# Patient Record
Sex: MALE | Race: WHITE | URBAN - METROPOLITAN AREA
[De-identification: names, ages, dates, MRNs, and addresses within clinical notes are randomized per-mention and may not be internally consistent; named-entity substitution may affect disease eponyms.]

---

## 2023-09-08 ENCOUNTER — HOSPITAL ENCOUNTER (EMERGENCY)
Facility: HOSPITAL | Age: 23
Discharge: HOME | End: 2023-09-08
Attending: EMERGENCY MEDICINE

## 2023-09-08 VITALS
DIASTOLIC BLOOD PRESSURE: 75 MMHG | SYSTOLIC BLOOD PRESSURE: 121 MMHG | OXYGEN SATURATION: 100 % | TEMPERATURE: 97.9 F | HEART RATE: 83 BPM | RESPIRATION RATE: 17 BRPM

## 2023-09-08 DIAGNOSIS — F19.10 POLYSUBSTANCE ABUSE (HCC): Primary | ICD-10-CM

## 2023-09-08 DIAGNOSIS — R56.9 CONVULSIONS, UNSPECIFIED CONVULSION TYPE (HCC): ICD-10-CM

## 2023-09-08 LAB
ALBUMIN SERPL-MCNC: 4 G/DL (ref 3.5–5)
ALBUMIN/GLOB SERPL: 1 (ref 1.1–2.2)
ALP SERPL-CCNC: 88 U/L (ref 45–117)
ALT SERPL-CCNC: 40 U/L (ref 12–78)
AMPHET UR QL SCN: NEGATIVE
ANION GAP SERPL CALC-SCNC: 9 MMOL/L (ref 5–15)
APAP SERPL-MCNC: <2 UG/ML (ref 10–30)
AST SERPL-CCNC: 25 U/L (ref 15–37)
BARBITURATES UR QL SCN: NEGATIVE
BASOPHILS # BLD: 0.1 K/UL (ref 0–0.1)
BASOPHILS NFR BLD: 1 % (ref 0–1)
BENZODIAZ UR QL: POSITIVE
BILIRUB SERPL-MCNC: 0.5 MG/DL (ref 0.2–1)
BUN SERPL-MCNC: 9 MG/DL (ref 6–20)
BUN/CREAT SERPL: 7 (ref 12–20)
CALCIUM SERPL-MCNC: 9.1 MG/DL (ref 8.5–10.1)
CANNABINOIDS UR QL SCN: POSITIVE
CHLORIDE SERPL-SCNC: 107 MMOL/L (ref 97–108)
CO2 SERPL-SCNC: 25 MMOL/L (ref 21–32)
COCAINE UR QL SCN: POSITIVE
COMMENT:: NORMAL
CREAT SERPL-MCNC: 1.27 MG/DL (ref 0.7–1.3)
DIFFERENTIAL METHOD BLD: ABNORMAL
EOSINOPHIL # BLD: 0.4 K/UL (ref 0–0.4)
EOSINOPHIL NFR BLD: 3 % (ref 0–7)
ERYTHROCYTE [DISTWIDTH] IN BLOOD BY AUTOMATED COUNT: 13.6 % (ref 11.5–14.5)
ETHANOL SERPL-MCNC: 189 MG/DL (ref 0–0.08)
GLOBULIN SER CALC-MCNC: 4.1 G/DL (ref 2–4)
GLUCOSE SERPL-MCNC: 104 MG/DL (ref 65–100)
HCT VFR BLD AUTO: 49.2 % (ref 36.6–50.3)
HGB BLD-MCNC: 17.2 G/DL (ref 12.1–17)
IMM GRANULOCYTES # BLD AUTO: 0 K/UL
IMM GRANULOCYTES NFR BLD AUTO: 0 %
LYMPHOCYTES # BLD: 5.9 K/UL (ref 0.8–3.5)
LYMPHOCYTES NFR BLD: 49 % (ref 12–49)
Lab: ABNORMAL
MCH RBC QN AUTO: 32.4 PG (ref 26–34)
MCHC RBC AUTO-ENTMCNC: 35 G/DL (ref 30–36.5)
MCV RBC AUTO: 92.7 FL (ref 80–99)
METHADONE UR QL: NEGATIVE
MONOCYTES # BLD: 0.9 K/UL (ref 0–1)
MONOCYTES NFR BLD: 7 % (ref 5–13)
NEUTS BAND NFR BLD MANUAL: 2 % (ref 0–6)
NEUTS SEG # BLD: 4.9 K/UL (ref 1.8–8)
NEUTS SEG NFR BLD: 38 % (ref 32–75)
NRBC # BLD: 0 K/UL (ref 0–0.01)
NRBC BLD-RTO: 0 PER 100 WBC
OPIATES UR QL: NEGATIVE
PCP UR QL: NEGATIVE
PLATELET # BLD AUTO: 317 K/UL (ref 150–400)
PMV BLD AUTO: 10 FL (ref 8.9–12.9)
POTASSIUM SERPL-SCNC: 3.2 MMOL/L (ref 3.5–5.1)
PROT SERPL-MCNC: 8.1 G/DL (ref 6.4–8.2)
RBC # BLD AUTO: 5.31 M/UL (ref 4.1–5.7)
RBC MORPH BLD: ABNORMAL
SALICYLATES SERPL-MCNC: 3 MG/DL (ref 2.8–20)
SODIUM SERPL-SCNC: 141 MMOL/L (ref 136–145)
SPECIMEN HOLD: NORMAL
SPECIMEN HOLD: NORMAL
WBC # BLD AUTO: 12.2 K/UL (ref 4.1–11.1)
WBC MORPH BLD: ABNORMAL

## 2023-09-08 PROCEDURE — 36415 COLL VENOUS BLD VENIPUNCTURE: CPT

## 2023-09-08 PROCEDURE — 82077 ASSAY SPEC XCP UR&BREATH IA: CPT

## 2023-09-08 PROCEDURE — 80179 DRUG ASSAY SALICYLATE: CPT

## 2023-09-08 PROCEDURE — 95717 EEG PHYS/QHP 2-12 HR W/O VID: CPT | Performed by: PSYCHIATRY & NEUROLOGY

## 2023-09-08 PROCEDURE — 80053 COMPREHEN METABOLIC PANEL: CPT

## 2023-09-08 PROCEDURE — 80307 DRUG TEST PRSMV CHEM ANLYZR: CPT

## 2023-09-08 PROCEDURE — 85025 COMPLETE CBC W/AUTO DIFF WBC: CPT

## 2023-09-08 PROCEDURE — 95706 EEG WO VID 2-12HR INTMT MNTR: CPT

## 2023-09-08 PROCEDURE — 80143 DRUG ASSAY ACETAMINOPHEN: CPT

## 2023-09-08 PROCEDURE — 99284 EMERGENCY DEPT VISIT MOD MDM: CPT

## 2023-09-08 NOTE — ED NOTES
Headband: applied  Date/Time: 9/8/2023 at 0400  Recorder: recording started  Skin: intact  Highest Seizure Girard Percentage past hour: 0%      General info regarding Seizure Girard %:  Minimum duration of study is 2 hours. If Seizure Girard has remained 0% throughout the entire 2-hour duration, communicate with provider to stop the recording. Seizure Girard 0-10% - Continue to monitor and complete 2-hour study. Seizure Girard 11-89% - Epileptiform activity present. Notify provider for next steps. Seizure Girard >/= 90% - Epileptiform activity consistent with Status Epilepticus. Immediately notify provider. *Patients with Seizure Girard above 10% that persists may require a study longer than 2 hours. Maximum recording duration is 24 hours. Please update provider with a persistent increase in Seizure Girard above 10%.      Recorder stopped at [address] [place], [place]  09/08/23 8974

## 2023-09-08 NOTE — ED NOTES
Pt able to independently ambulate with a steady gait, alert and oriented x4.      Gege Gordon RN  09/08/23 1016

## 2023-09-08 NOTE — ED NOTES
Bedside and Verbal shift change report given to Rioskelsi Harris (oncoming nurse) by Kirby Frausto (offgoing nurse). Report included the following information Nurse Handoff Report, Index, ED Encounter Summary, ED SBAR, MAR, and Recent Results.         Jacqui Dangelo RN  09/08/23 0050

## 2023-09-08 NOTE — ED TRIAGE NOTES
Patient arrived via EMS w/ c/o possible seizure. In route, patient received 10 IM versed and 5 mg intranasally.      Hx: seizures and drug abuse

## 2023-09-08 NOTE — ED NOTES
Bedside and Verbal shift change report given to ARON Kimball (oncoming nurse) by ARON Sharpe (offgoing nurse). Report included the following information Nurse Handoff Report, Index, ED Encounter Summary, ED SBAR, Adult Overview, MAR, and Recent Results.        Micky Gonzalez RN  09/08/23 6433

## 2023-09-09 NOTE — PROCEDURES
PROCEDURE: RAPID EEG  NAME:   Gilson York  ACCOUNT NUMBER : [de-identified]  MRN:   [id number]  DATE OF SERVICE: 9/8/2023     HISTORY/INDICATION: Pt is a 18yo male with polysubstance use with seizure-like activity with EMS. Rapid EEG placed in the ED to assess for underlying epilepsy. CONDITIONS OF RECORDING: This EEG was obtained using a 10 lead, 8 channel system positioned circumferentially without any parasagittal coverage (rapid EEG). Computer selected EEG is reviewed as well as background features and all clinically significant events. Clarity algorithm utilized and implemented to provide analysis of underlying activity and seizure detection used to facilitate reading. This study began on 9/8/23 at 0403 and ended on 9/8/23 at 0606. I have reviewed the study in its entirety. DESCRIPTION:   Background consists of a mixture of alpha and beta activity. Amplitudes appear symmetric. Significant muscle and lead artifact seen in the T3, T5, T6 leads frequently. Within the limitations of the study, there are no clear or well-formed electrographic seizures. INTERPRETATION: This rapid EEG does not show any clear or well-formed seizures within the confines and limitations of the study. Note the absence of electrographic seizures does not rule out the diagnosis of a seizure disorder. Clinical correlation is advised. If clinical suspicion still persists, would recommend continuous EEG monitoring with a 10-20 international electrode system for improved spatial resolution and parasagittal coverage.     Computer generated seizure burden 0%    Monica Ronquillo MD